# Patient Record
Sex: MALE | Race: WHITE | Employment: FULL TIME | URBAN - METROPOLITAN AREA
[De-identification: names, ages, dates, MRNs, and addresses within clinical notes are randomized per-mention and may not be internally consistent; named-entity substitution may affect disease eponyms.]

---

## 2022-03-24 ENCOUNTER — HOSPITAL ENCOUNTER (EMERGENCY)
Facility: CLINIC | Age: 67
Discharge: HOME | End: 2022-03-24
Attending: PHYSICIAN ASSISTANT | Admitting: PHYSICIAN ASSISTANT
Payer: COMMERCIAL

## 2022-03-24 ENCOUNTER — APPOINTMENT (OUTPATIENT)
Dept: CT IMAGING | Facility: CLINIC | Age: 67
End: 2022-03-24
Attending: PHYSICIAN ASSISTANT
Payer: COMMERCIAL

## 2022-03-24 VITALS
HEIGHT: 68 IN | WEIGHT: 192.02 LBS | TEMPERATURE: 97.5 F | RESPIRATION RATE: 16 BRPM | HEART RATE: 59 BPM | SYSTOLIC BLOOD PRESSURE: 136 MMHG | BODY MASS INDEX: 29.1 KG/M2 | DIASTOLIC BLOOD PRESSURE: 69 MMHG | OXYGEN SATURATION: 97 %

## 2022-03-24 DIAGNOSIS — K57.32 DIVERTICULITIS OF COLON: ICD-10-CM

## 2022-03-24 LAB
ALBUMIN SERPL-MCNC: 3.7 G/DL (ref 3.4–5)
ALBUMIN UR-MCNC: 10 MG/DL
ALP SERPL-CCNC: 109 U/L (ref 40–150)
ALT SERPL W P-5'-P-CCNC: 45 U/L (ref 0–70)
ANION GAP SERPL CALCULATED.3IONS-SCNC: 4 MMOL/L (ref 3–14)
APPEARANCE UR: CLEAR
AST SERPL W P-5'-P-CCNC: 21 U/L (ref 0–45)
BASOPHILS # BLD AUTO: 0.1 10E3/UL (ref 0–0.2)
BASOPHILS NFR BLD AUTO: 1 %
BILIRUB DIRECT SERPL-MCNC: 0.1 MG/DL (ref 0–0.2)
BILIRUB SERPL-MCNC: 0.7 MG/DL (ref 0.2–1.3)
BILIRUB UR QL STRIP: NEGATIVE
BUN SERPL-MCNC: 24 MG/DL (ref 7–30)
CALCIUM SERPL-MCNC: 9.8 MG/DL (ref 8.5–10.1)
CHLORIDE BLD-SCNC: 105 MMOL/L (ref 94–109)
CO2 SERPL-SCNC: 26 MMOL/L (ref 20–32)
COLOR UR AUTO: YELLOW
CREAT SERPL-MCNC: 1.4 MG/DL (ref 0.66–1.25)
EOSINOPHIL # BLD AUTO: 0.2 10E3/UL (ref 0–0.7)
EOSINOPHIL NFR BLD AUTO: 2 %
ERYTHROCYTE [DISTWIDTH] IN BLOOD BY AUTOMATED COUNT: 13.5 % (ref 10–15)
GFR SERPL CREATININE-BSD FRML MDRD: 56 ML/MIN/1.73M2
GLUCOSE BLD-MCNC: 123 MG/DL (ref 70–99)
GLUCOSE UR STRIP-MCNC: NEGATIVE MG/DL
HCT VFR BLD AUTO: 43.1 % (ref 40–53)
HGB BLD-MCNC: 13.9 G/DL (ref 13.3–17.7)
HGB UR QL STRIP: NEGATIVE
IMM GRANULOCYTES # BLD: 0 10E3/UL
IMM GRANULOCYTES NFR BLD: 0 %
KETONES UR STRIP-MCNC: NEGATIVE MG/DL
LEUKOCYTE ESTERASE UR QL STRIP: NEGATIVE
LIPASE SERPL-CCNC: 71 U/L (ref 73–393)
LYMPHOCYTES # BLD AUTO: 2.1 10E3/UL (ref 0.8–5.3)
LYMPHOCYTES NFR BLD AUTO: 22 %
MCH RBC QN AUTO: 29.9 PG (ref 26.5–33)
MCHC RBC AUTO-ENTMCNC: 32.3 G/DL (ref 31.5–36.5)
MCV RBC AUTO: 93 FL (ref 78–100)
MONOCYTES # BLD AUTO: 0.7 10E3/UL (ref 0–1.3)
MONOCYTES NFR BLD AUTO: 8 %
MUCOUS THREADS #/AREA URNS LPF: PRESENT /LPF
NEUTROPHILS # BLD AUTO: 6.3 10E3/UL (ref 1.6–8.3)
NEUTROPHILS NFR BLD AUTO: 67 %
NITRATE UR QL: NEGATIVE
NRBC # BLD AUTO: 0 10E3/UL
NRBC BLD AUTO-RTO: 0 /100
PH UR STRIP: 5 [PH] (ref 5–7)
PLATELET # BLD AUTO: 377 10E3/UL (ref 150–450)
POTASSIUM BLD-SCNC: 4.4 MMOL/L (ref 3.4–5.3)
PROT SERPL-MCNC: 7.9 G/DL (ref 6.8–8.8)
RBC # BLD AUTO: 4.65 10E6/UL (ref 4.4–5.9)
RBC URINE: 1 /HPF
SODIUM SERPL-SCNC: 135 MMOL/L (ref 133–144)
SP GR UR STRIP: 1.02 (ref 1–1.03)
SPERM #/AREA URNS HPF: PRESENT /HPF
UROBILINOGEN UR STRIP-MCNC: NORMAL MG/DL
WBC # BLD AUTO: 9.5 10E3/UL (ref 4–11)
WBC URINE: 1 /HPF

## 2022-03-24 PROCEDURE — 99285 EMERGENCY DEPT VISIT HI MDM: CPT | Mod: 25

## 2022-03-24 PROCEDURE — 81001 URINALYSIS AUTO W/SCOPE: CPT | Performed by: PHYSICIAN ASSISTANT

## 2022-03-24 PROCEDURE — 74177 CT ABD & PELVIS W/CONTRAST: CPT

## 2022-03-24 PROCEDURE — 83690 ASSAY OF LIPASE: CPT | Performed by: PHYSICIAN ASSISTANT

## 2022-03-24 PROCEDURE — 85025 COMPLETE CBC W/AUTO DIFF WBC: CPT | Performed by: PHYSICIAN ASSISTANT

## 2022-03-24 PROCEDURE — 82248 BILIRUBIN DIRECT: CPT | Performed by: PHYSICIAN ASSISTANT

## 2022-03-24 PROCEDURE — 250N000011 HC RX IP 250 OP 636: Performed by: PHYSICIAN ASSISTANT

## 2022-03-24 PROCEDURE — 80048 BASIC METABOLIC PNL TOTAL CA: CPT | Performed by: PHYSICIAN ASSISTANT

## 2022-03-24 PROCEDURE — 85018 HEMOGLOBIN: CPT | Performed by: EMERGENCY MEDICINE

## 2022-03-24 PROCEDURE — 250N000009 HC RX 250: Performed by: PHYSICIAN ASSISTANT

## 2022-03-24 PROCEDURE — 36415 COLL VENOUS BLD VENIPUNCTURE: CPT | Performed by: EMERGENCY MEDICINE

## 2022-03-24 PROCEDURE — 82310 ASSAY OF CALCIUM: CPT | Performed by: EMERGENCY MEDICINE

## 2022-03-24 RX ORDER — IOPAMIDOL 755 MG/ML
500 INJECTION, SOLUTION INTRAVASCULAR ONCE
Status: COMPLETED | OUTPATIENT
Start: 2022-03-24 | End: 2022-03-24

## 2022-03-24 RX ORDER — OXYCODONE HYDROCHLORIDE 5 MG/1
5 TABLET ORAL EVERY 6 HOURS PRN
Qty: 12 TABLET | Refills: 0 | Status: SHIPPED | OUTPATIENT
Start: 2022-03-24 | End: 2022-03-27

## 2022-03-24 RX ORDER — ONDANSETRON 4 MG/1
4 TABLET, ORALLY DISINTEGRATING ORAL EVERY 8 HOURS PRN
Qty: 10 TABLET | Refills: 0 | Status: SHIPPED | OUTPATIENT
Start: 2022-03-24 | End: 2022-03-27

## 2022-03-24 RX ADMIN — SODIUM CHLORIDE 64 ML: 9 INJECTION, SOLUTION INTRAVENOUS at 12:28

## 2022-03-24 RX ADMIN — IOPAMIDOL 96 ML: 755 INJECTION, SOLUTION INTRAVENOUS at 12:28

## 2022-03-24 ASSESSMENT — ENCOUNTER SYMPTOMS
ABDOMINAL PAIN: 1
SHORTNESS OF BREATH: 0
HEMATURIA: 0
CHILLS: 0
DIARRHEA: 0
BACK PAIN: 0
CONSTIPATION: 0
DYSURIA: 0
VOMITING: 0
NAUSEA: 0
FLANK PAIN: 0
BLOOD IN STOOL: 0
FEVER: 0

## 2022-03-24 NOTE — DISCHARGE INSTRUCTIONS
Take full course of antibiotics.  Use Tylenol and ibuprofen for pain control.  Use oxycodone for breakthrough pain. This is sedating. Do not drive after taking.  Use Zofran as needed if you become nauseated.

## 2022-03-24 NOTE — ED TRIAGE NOTES
Patient presents with right sided abdominal pain that started yesterday. Denies N/V/D. Endorses a lot of flatus. Took a couple tums yesterday without relief.

## 2022-03-24 NOTE — ED PROVIDER NOTES
History   Chief Complaint:  Abdominal Pain     The history is provided by the patient.      Casa Trejo is a 67 year old male with history of hypertension and hyperlipidemia who presents with abdominal pain. Patient reports onset of right lower quadrant pain 2 days ago. Describes that the pain is mild when he is sitting, but becomes severe when he changes positions, takes deeps breaths, hiccups and coughs. Pain has been constant, to some degree, and has not radiated. Rates the pain as 5-6/10. No fevers or chills. States he has a mild cold, but no chest pain or shortness of breath. No nausea or vomiting. Adds that he is passing a significant amount of gas, with no alleviation of his pain. No diarrhea, black/bloody stools or constipation. No urinary symptoms, including dysuria and hematuria. No back or flank pain. He does not have his gallbladder, but still has an appendix. No history of abdominal hernias.     Review of Systems   Constitutional: Negative for chills and fever.   Respiratory: Negative for shortness of breath.    Cardiovascular: Negative for chest pain.   Gastrointestinal: Positive for abdominal pain. Negative for blood in stool, constipation, diarrhea, nausea and vomiting.   Genitourinary: Negative for dysuria, flank pain and hematuria.   Musculoskeletal: Negative for back pain.   All other systems reviewed and are negative.    Allergies:  Amoxicillin    Medications:  Lipitor  Zestoretic    Past Medical History:     Hypertension  Hyperlipidemia  Tobacco use    Past Surgical History:    Cholecystectomy    Social History:  Patient presents with wife    Presents via private vehicle    Physical Exam     Patient Vitals for the past 24 hrs:   BP Temp Temp src Pulse Resp SpO2 Height Weight   03/24/22 1400 120/72 -- -- 76 -- 98 % -- --   03/24/22 1345 112/60 -- -- 58 -- 96 % -- --   03/24/22 1330 120/61 -- -- 59 -- 97 % -- --   03/24/22 1300 101/71 -- -- 58 -- 96 % -- --   03/24/22 1245 114/64  "-- -- 61 -- 96 % -- --   03/24/22 0935 (!) 140/73 97.5  F (36.4  C) Oral 68 16 99 % 1.727 m (5' 8\") 87.1 kg (192 lb 0.3 oz)       Physical Exam  Constitutional: Pleasant. Cooperative.   Eyes: Pupils equally round and reactive  HENT: Head is normal in appearance. Oropharynx is normal with moist mucus membranes.  Cardiovascular: Regular rate and rhythm and without murmurs.  Respiratory: Normal respiratory effort, lungs are clear bilaterally.  GI: Mild diffuse TTP, worst RLQ. No guarding, rebound, or rigidity.  Musculoskeletal: No asymmetry of the lower extremities, no tenderness to palpation.   Skin: Normal, without rash.  Neurologic: Cranial nerves grossly intact, normal cognition, no focal deficits. Alert and oriented x 3.   Psychiatric: Normal affect.  Nursing notes and vital signs reviewed.      Emergency Department Course   Imaging:  CT Abdomen Pelvis w Contrast   Preliminary Result   IMPRESSION:    1. Colonic diverticulosis predominantly of the sigmoid colon with   minimal pericolonic fat stranding along a sigmoid colon diverticulum,   likely represent mild acute diverticulitis. No evidence of colonic   perforation or pericolonic loculated collection.           Report per radiology.    Laboratory:  Labs Ordered and Resulted from Time of ED Arrival to Time of ED Departure   BASIC METABOLIC PANEL - Abnormal       Result Value    Sodium 135      Potassium 4.4      Chloride 105      Carbon Dioxide (CO2) 26      Anion Gap 4      Urea Nitrogen 24      Creatinine 1.40 (*)     Calcium 9.8      Glucose 123 (*)     GFR Estimate 56 (*)    ROUTINE UA WITH MICROSCOPIC REFLEX TO CULTURE - Abnormal    Color Urine Yellow      Appearance Urine Clear      Glucose Urine Negative      Bilirubin Urine Negative      Ketones Urine Negative      Specific Gravity Urine 1.024      Blood Urine Negative      pH Urine 5.0      Protein Albumin Urine 10  (*)     Urobilinogen Urine Normal      Nitrite Urine Negative      Leukocyte Esterase " Urine Negative      Mucus Urine Present (*)     Sperm Urine Present (*)     RBC Urine 1      WBC Urine 1     LIPASE - Abnormal    Lipase 71 (*)    HEPATIC FUNCTION PANEL - Normal    Bilirubin Total 0.7      Bilirubin Direct 0.1      Protein Total 7.9      Albumin 3.7      Alkaline Phosphatase 109      AST 21      ALT 45     CBC WITH PLATELETS AND DIFFERENTIAL    WBC Count 9.5      RBC Count 4.65      Hemoglobin 13.9      Hematocrit 43.1      MCV 93      MCH 29.9      MCHC 32.3      RDW 13.5      Platelet Count 377      % Neutrophils 67      % Lymphocytes 22      % Monocytes 8      % Eosinophils 2      % Basophils 1      % Immature Granulocytes 0      NRBCs per 100 WBC 0      Absolute Neutrophils 6.3      Absolute Lymphocytes 2.1      Absolute Monocytes 0.7      Absolute Eosinophils 0.2      Absolute Basophils 0.1      Absolute Immature Granulocytes 0.0      Absolute NRBCs 0.0         Procedures    Emergency Department Course:         Reviewed:  I reviewed nursing notes, vitals, past medical history and Care Everywhere    Assessments/Consults:  1151 I obtained history and examined the patient. Patient declined pain medications.   1442 Patient rechecked and updated. Amenable to discharge and understands diverticulitis precautions.    Disposition:  The patient was discharged to home.     Impression & Plan   Medical Decision Making:  Casa Trejo is a 67 year old male who presents for evaluation of abdominal pain. See HPI as above for additional details. Vitals and physical exam as above. A broad differential diagnosis was considered, including appendicitis, SBO, kidney stone, pyelo, shingles, diverticulitis, perforated viscus, among others. Work up as above. CT confirms diverticulitis without abscess or perforation; this appears consistent with the history and physical exam so I doubt another underlying etiology is also present.  The patient's pain has been controlled by interventions in the emergency  department.  This represents uncomplicated diverticulitis at this time.  There are no signs of sepsis, shock or bacteremia.  The natural history of this problem was discussed, and I educated the patient regarding the symptoms and signs that should prompt return to the Emergency Department.  This would include worsening fevers, chills, vomiting, and more intense pain.  The patient is to take antibiotics and pain medications as directed.    Follow-up with primary care physician is indicated in 1-2 days. Patient discharged to home in stable condition.    Diagnosis:    ICD-10-CM    1. Diverticulitis of colon  K57.32        Discharge Medications:  New Prescriptions    AMOXICILLIN-CLAVULANATE (AUGMENTIN) 875-125 MG TABLET    Take 1 tablet by mouth 2 times daily for 7 days    ONDANSETRON (ZOFRAN ODT) 4 MG ODT TAB    Take 1 tablet (4 mg) by mouth every 8 hours as needed for nausea or vomiting    OXYCODONE (ROXICODONE) 5 MG TABLET    Take 1 tablet (5 mg) by mouth every 6 hours as needed for pain       Scribe Disclosure:  I, Robert Tobin, am serving as a scribe at 11:12 AM on 3/24/2022 to document services personally performed by Luisito Tran PA-C based on my observations and the provider's statements to me.     This record was created at least in part using electronic voice recognition software, so please excuse any typographical errors.          Luisito Tran PA-C  03/24/22 1918